# Patient Record
(demographics unavailable — no encounter records)

---

## 2018-12-22 NOTE — RAD
RIGHT KNEE THREE VIEWS:

12/22/18

 

HISTORY: 

Knee injury.

 

Bones are demineralized. There is some mild arthritic changes in the knee without signs of fracture. 
There is a chondroid type lesion of the distal femoral shaft. This could represent an enchondroma. Th
e size raises the possibility of a low grade chondrosarcoma. I do not have any old films for comparis
on. 

 

IMPRESSION:  

1.      Chondroid lesion of the distal femoral shaft probably benign but followup examination in six 
months would be recommended for assessment. 

2.      No evidence of fracture. 

 

POS: [facility]

## 2018-12-22 NOTE — RAD
RIGHT HIP TWO VIEWS:

12/22/18

 

HISTORY: 

Fall with hip pain.

 

There is an intertrochanteric fracture of the right hip with associated coxa vara deformity. Old trau
ma to the symphysis region is noted.

 

IMPRESSION:  

Acute intertrochanteric fracture of the right hip. 

 

POS: [facility]

## 2018-12-23 NOTE — CON
DATE OF CONSULTATION:  2018



CHIEF COMPLAINT:  Right hip pain.



CONSULTING PHYSICIANS:  ER.



HISTORY OF PRESENT ILLNESS:  Ms. Flores is an 89-year-old female, status post 
fall

while walking in her house.  Denies loss of conscious or headaches.  The patient

got up without her walker, slipped and fell on her right hip and complained of

right hip pain.  The patient's family is at bedside.  She lives at home alone 
with

the recent death of her .  The patient's caretakers are close by. 



PAST MEDICAL HISTORY:  Arthritis, high cholesterol, hypertension, hypothyroidism
,

history of pelvis fracture. 



PAST SURGICAL HISTORY:  .



ALLERGIES:  NO KNOWN DRUG ALLERGIES.



MEDICATIONS:  

1. Tylenol.

2. Fosamax.

3. Aspirin.

4. Lipitor.

5. Atrovent.

6. Celebrex.

7. Flonase.

8. Synthroid.

9. Myrbetriq.

10. Zestril.

11. Zoloft.

12. Zantac.

13. Tylenol p.m.

14. Zyrtec.

15. Vitamin D.



SOCIAL HISTORY:  Nonsmoker and nondrinker.  Recent passing of her .



REVIEW OF SYSTEMS:  Noncontributory.



PHYSICAL EXAMINATION:

VITAL SIGNS:  The patient's vital signs are; temperature 97.9, pulse rate 100,

respiratory rate 18, 92% on room air, and blood pressure 145/76. 

GENERAL:  Alert and oriented female, in no acute distress. 

EXTREMITIES:  Right lower extremity, no skin lesions.  External rotated hip with

shortening.  The patient has brisk cap refill.  She has sensation intact L4 
through

S1 distribution in plantar flexion, dorsiflexion, extendsand flexes her toes.  
She

has pain with internal and external rotation. 



LABORATORY DATA:  The patient's labs show H and H of 10 and 30.  INR of 1.  She 
has

creatinine of 0.7.  She has moderate leukocyte esterase. 



RADIOGRAPHIC DATA:  The patient's radiographs show a right intertrochanteric hip

fracture with reverse obliquity pattern. 



IMPRESSION:  Right intertrochanteric hip fracture.



ASSESSMENT AND PLAN:  The patient was taken the OR today for a right 
intramedullary

nailing with cephalomedullary nail.  Discussed with family the risks and 
benefits of

surgery, pain, scar, bleeding, infection, damage to vital structures, nonunion,

malunion, failure of procedure, fracture, loss of life or limb.  She

understands the risks and benefits and understands, she will loose her level of

function.  I discussed with them the mortality associated with this fracture.  
The

family understands all this and they would like to proceed.  We will take her to

surgery today. 







Job ID:  [id number]



MTDD

## 2018-12-23 NOTE — HP
ATTENDING SURGEON:  Dr. Hagan.



HISTORY OF PRESENT ILLNESS:  An 89-year-old female, had a ground-level fall. Te

patient was in her kitchen and not using her walker like she was supposed to, and

when she went to turn, slipped and fell.  The patient reports right leg pain and

right knee pain.  Denies loss of consciousness.  Denies hitting her head.  The

patient activated her medical alert immediately.  The patient was brought in by EMS

with right external rotation and shortening.  The patient was evaluated by the ER.

The patient with a right intertrochanteric fracture.  The patient was given morphine

and Zofran in the ER for pain.  The patient currently resting supine in no distress.

 The patient's daughter is at bedside, who states that the patient does live alone

and her  just recently passed away a few days ago.  The patient has

caretakers whom come and check on her often and the daughter lives very close by.

The daughter does report that she does get confused sometimes. 



PAST MEDICAL HISTORY:  Arthritis, high cholesterol, hypertension, hypothyroidism.

The patient had a fall in  with pelvic fracture nonoperative, the patient went

to rehab at that time, also back injury nonoperative where she completed rehab. 



PAST SURGICAL HISTORY:  .



ALLERGIES:  NO KNOWN DRUG ALLERGIES.



HOME MEDICATIONS:  

1. Acetaminophen 1 g daily.

2. Fosamax 70 mg weekly.

3. Aspirin 81 mg daily.

4. Lipitor 40 mg daily.

5. Atrovent as needed.

6. Celebrex 200 mg.

7. Flonase as needed.

8. Synthroid 75 mcg on Monday through Saturday, 112 on .

9. Myrbetriq 50 mcg.

10. Zestril 5 mg p.o. daily.

11. Zoloft 100 mg daily.

12. Zantac twice a day 150.

13. Tylenol PM Extra Strength 2 nightly.

14. Zyrtec daily.

15. Vitamin D daily.



REVIEW OF SYSTEMS:  GENERAL:  Denies any recent illness.  Denies any chills or

fever. 

HEENT:  Denies any vision changes.  Denies neck pain. 

CARDIOVASCULAR:  Denies any chest pain or palpitations or previous cardiovascular

history. 

RESPIRATORY:  Denies any cough, shortness of breath, or wheezing. 

GI:  Denies abdominal pain.  Denies nausea, vomiting, or diarrhea.  The patient does

report issues with constipation. 

GI:  Denies any dysuria or frequency. 

MUSCULOSKELETAL:  Complaints of right knee pain and right thigh pain.  Denies any

back pain. 

NEUROLOGIC:  Denies headache.  Denies mental status changes.  Denies sensory deficit

changes. 



PHYSICAL EXAMINATION:

VITAL SIGNS:  Blood pressure 146/77, SpO2 93% on room air, heart rate 91,

respirations 16, temperature 98.1. 

GENERAL:  The patient is awake, alert, in no distress; oriented to person, place,

and event. 

HEENT:  The patient has a small abrasion under the nose, otherwise head is

atraumatic.  Pupils equal and reactive at 4 mm. 

NECK:  Trachea is midline.  The patient has no neck tenderness.  She has full range

of motion. 

SKIN:  Skin is dry.  No bruises or rashes noted. 

CARDIOVASCULAR:  The patient with regular rate and rhythm.  There are no murmurs. 

RESPIRATORY:  Respirations even and unlabored.  Chest is symmetrical.  Equal breath

sounds.  There are no wheezes, crackles, or rales. 

ABDOMEN:  Soft, nontender.  Active bowel sounds. 

MUSCULOSKELETAL:  The patient with right lower leg shortening and external rotation.

 Positive +2 pedal pulses bilaterally.  The patient moves all extremities and

sensation noted in all extremities.  The patient does have a small abrasion to the

right knee.  There is no deformity noted to the knee or swelling. 

NEUROLOGIC:  Cranial nerves are intact.  Sensation and strength, intact.



LABORATORY DATA:  WBC 12.7, RBC 3.79, hemoglobin 11.1, hematocrit 35.0, platelets

293.  PT 12.7, INR 1.0, aPTT 30.9.  Sodium 139, potassium 4.4, chloride 105, BUN 22,

creatinine 0.80, glucose 131, calcium 9.2.  AST 23, ALT 15, alkaline phosphatase 87.

 Troponin less than 0.010.  Albumin 7.0.  A UA was collected that has not resulted

at this time. 



DIAGNOSTIC DATA:  Acute intertrochanteric fracture of the right hip.  Chest x-ray,

mildly hyperinflated lungs, no acute cardiopulmonary process.  Official read is

unavailable. 



EKG shows normal sinus rhythm.  There is no ST elevation.



ASSESSMENT:  

1. Mechanical fall.

2. Acute traumatic pain.

3. Right hip fracture.

4. History of arthritis.

5. History of hypertension.



PLAN:  We will admit the patient to the Surgical/Ortho Floor.  Dr. Flower has

already been contacted by the ER provider and was notified.  We will take the

patient to the OR in the morning.  No specific time reported.  We will place the

patient on pain regimen and bedrest.  The patient has been n.p.o. since 7 p.m.  We

will place PT/OT consult, also prepare for placement to rehab.  This patient has

been discussed with the attending physician. 







Job ID:  [id number]

## 2018-12-23 NOTE — RAD
PORTABLE CHEST 1 VIEW:

 

Date:  12/23/18 

Time:  0011 hours

 

HISTORY:  

Chest pain. 

 

FINDINGS:

 

Comparison made with exam of 01/13/16. 

 

The heart size is normal. The aorta is tortuous. No lobar consolidation, pneumothoraces, enma pulmon
dana edema, or pleural effusions are seen. 

 

IMPRESSION: 

No acute process. 

 

 

 

POS: [facility]

## 2018-12-23 NOTE — RAD
FRONTAL RADIOGRAPH PELVIS:

 

Date:  12/23/18 

 

COMPARISON:  

03/14/16. 

 

HISTORY:  

Injury, trauma, pain. 

 

FINDINGS:

There are severe degenerative changes involving bilateral hips with joint space narrowing, subchondra
l sclerosis, and osteophyte formation. There are old fractures of the superior and inferior pubic paulie
i medially bilaterally, and there are multiple incompletely assessed lower lumbar spine fractures, al
so which appear stable. There is a new, comminuted, obliquely oriented fracture of the proximal right
 femur, which is probably subtrochanteric in nature and incompletely assessed on this examination. Th
e lesser trochanter appears proximally and medially displaced, and the distal fracture fragment demon
strates varus angulation. 

 

IMPRESSION: 

Proximal right femur fracture. 

 

 

POS: [facility]

## 2018-12-23 NOTE — PRG
DATE OF SERVICE:  12/23/2018



SUBJECTIVE:  An 89-year-old female with ground level fall.  Postop day zero.  The

patient is awake, alert.  Pain is well controlled at this time.  The patient is

tolerating a regular diet at this time.  The patient voices no concerns other than

she typically takes 2 Tylenol PM for sleep. 



OBJECTIVE:  VITAL SIGNS:  Temperature 97.9, pulse 76, respirations 16, SpO2 of 95%

on room air.  Blood pressure 126/60. 

GENERAL:  The patient awake, alert, in no distress. 

RESPIRATORY:  Respirations even, nonlabored, symmetrical, no distress. 

ABDOMEN:  Soft, nontender.  Active bowel sounds. 

MUSCULOSKELETAL:  Mild pain to right hip area.  Positive distal pulses and

sensation.  Able to move all extremities. 

NEUROLOGIC:  Cranial nerves intact.  The patient has normal strength.



LABORATORY DATA:  WBC 10.7, RBC 3.32, hemoglobin 10.1, hematocrit 30.5, platelets

249.  Sodium 135, potassium 4.4, chloride 106, CO2 of 21, BUN 21, creatinine 0.70,

GFR 79, glucose 122, calcium 8.7.  The patient with trace ketones in her urine,

moderate leukocyte esterase, urine wbc's 7 to 10, squamous cells high.  The patient

with Hyaline cast. 



ASSESSMENT AND PLAN:  

1. Ground level fall.

2. Right hip fracture, postop day zero open reduction and internal fixation.

3. Acute traumatic pain.

4. History of arthritis and hypertension.

5. Urinary tract infection.



PLAN:  We will leave Nino overnight and monitor urine output.  We will continue IV

fluids overnight.  We will place the patient on a p.o. pain regimen.  PT/OT has been 

consulted and will work with the patient starting tomorrow.  Continue a regular

diet.  We will order melatonin for sleep instead of Tylenol PM that the patient

usually takes since 

the patient is getting pain medication and Tylenol already.  The patient started on

antibiotics for UTI.  Plan has been discussed with the attending surgeon. 







Job ID:  [id number]

## 2018-12-23 NOTE — OP
DATE OF PROCEDURE:  12/23/2018



PREOPERATIVE DIAGNOSIS:  Right intertrochanteric with subtrochanteric extension

4-part proximal femur fracture. 



PROCEDURE: Cephallomedullary nailng, TFNA



ASSISTANT:  Walter.



ANESTHESIA:  Joaquin. The patient received a general endotracheal intubation.



ESTIMATED BLOOD LOSS:  100 mL.



TOURNIQUET TIME:  None.



IMPLANTS:  Synthes 11 x 130-degree x 400 mm TFNA nail, a 95 mm TFNA screw, and 
a 5

mm distal locking screw. 



ANTIBIOTICS:  Ancef 2 g, 500 mg of Levaquin, and TXA 1 g.



COMPLICATIONS:  None.



INDICATIONS FOR PROCEDURE:  Ms. Flores is a pleasant 89-year-old female, 
status

post ground level fall with a right proximal femur fracture and subtrochanteric

extension. I discussed the risks and benefits of surgery, pain, scar, bleeding,

infection, damage to vital structures, decreased range of motion and strength,

continued pain despite surgical intervention, loss of life or limb, and failure 
of

procedure. The patient understood all these risks and benefits and elected to

proceed. 



DESCRIPTION OF PROCEDURE:  Time-out was performed. The patient's right lower

extremity as the operative site based on site, consents, and markings. After

time-out, the patient's right leg was put in traction. I made a posterior 
oblique

incision just proximal to the greater tip down to the greater trochanter, felt 
based

on AP and lateral radiographs, the front-to-back.  We then placed our starting

point. We then opened the reamer and reamed with opening reamer. We then passed 
our

guidewire. Noticed that there was a little apex anterior deformity, which made a

small stab incision and used a Kocher to push down the anterior fragment wall 
for

reduction for reaming. We went distally. The 10 was actually being medialized 
by the

enchondroma within the femur. We therefore used a finger to help to push 
through the

enchondroma and placed it more in a center-center position. After placing it in

center-center position, we reamed up to an 11, felt we got good overall 
alignment

and positioning. We then removed, placed our implant and after it measured 11 x 
400,

felt we had a good stable positioning. It helped to reduce the fracture. 
Testing our

position, got the gripping of the metaphysis when we reamed, measured for 95 and

placed 95. We compressed and __________ down to allow for sliding. We let the

traction off the femur. We then took pictures. We were happy with the alignment 
of

the proximal femur. We moved distally. We placed a distal screw in the static

position with two cortices, and we made a small stab incision. We came back. We

washed and closed all the holes for screws as well as our stab incision and our

entry nails, positioned with O2 and staples. 



The patient will be admitted to the hospital. She will receive antibiotics for 
24

hours. She will be weightbearing as tolerated. We will follow inhouse. 







Job ID:  [id number]



MTDD

## 2018-12-24 NOTE — PRG
DATE OF SERVICE:  12/24/2018



SUBJECTIVE:  This is an 89-year-old female with a ground level fall.  She is postop

day #1, status post ORIF of the right hip.  The patient is awake and alert in bed,

and pain is well controlled at this time.  The patient continues to tolerate a

regular diet.  The patient voices no concerns at this time. 



OBJECTIVE:  VITAL SIGNS:  Temperature 98.5, respirations 16, SpO2 96% on room air,

and blood pressure 110/60. 

GENERAL:  The patient is awake, alert, sitting up in bed, in no distress. 

RESPIRATORY:  Respirations even nonlabored, symmetrical, no distress. 

ABDOMEN:  Soft, nontender.  Active bowel sounds. 

MUSCULOSKELETAL:  Moves all extremities.  Distal pedal pulses.  Normal sensation. 

NEUROLOGIC:  No deficits noted.



LABORATORY DATA:  White blood count 7.3, RBC 2.28, hemoglobin 6.9, hematocrit 21.1,

and platelet 203.  Sodium 134, potassium 4.1, chloride 104, CO2 25, BUN 22,

creatinine 0.87, GFR 61, glucose 114, calcium 8.5, phosphorus 3.5, and magnesium

1.7. 



ASSESSMENT:  

1. Ground level fall.

2. Right hip fracture, postop day 1 open reduction and internal fixation.

3. Acute traumatic pain.

4. History of arthritis and hypertension.

5. Urinary tract infection.



PLAN:  We will transfuse 1 unit of packed red blood cells.  The patient started on

iron and vitamin C.  We will repeat H and H this evening.  Continue the patient's

Macrobid for UTI.  Continue PT/OT.  Case Management to help with placement and

rehab.  We will continue the patient's pain regimen.  The patient has been discussed

with the attending surgeon. 







Job ID:  [id number]

## 2018-12-24 NOTE — RAD
RIGHT HIP 2 VIEWS:

 

Date:  12/23/18 

 

HISTORY:  

Open reduction and internal fixation right hip, right femur fracture. 

 

FINDINGS/IMPRESSION: 

Three spot fluoroscopic intraoperative images of the right hip demonstrating interval reduction and i
nternal fixation of the proximal femoral fracture since the previous day's exam. 

 

 

POS: [facility]

## 2018-12-25 NOTE — HP
CHIEF COMPLAINT:  Right hip pain.



HISTORY OF PRESENT ILLNESS:  Ms. Flores is an 89-year-old woman, who fell at home,

landing on her right side. She had an intertrochanteric right femur fracture

underwent ORIF by Orthopedics last night and I saw the patient on morning rounds

with Scott Johnson, Trauma PA and Chery Brunson, Trauma Nurse Practitioner. For full

details, please see Ms. Nannetet's note, the details of which I have confirmed. The

patient denies any lightheadedness or dizziness preceding her fall and states that

she simply lost her footing because she slipped and was not using her walker. She

denies loss of consciousness or amnesia to the event. She does live alone and her

family reports some episodes of confusion. She has multiple medical problems, which

are managed on medication and her long list of medications was reviewed. 



REVIEW OF SYSTEMS:  Negative except for right hip pain, which she states is much

better than it was before the surgery. 



Labs and x-rays are reviewed and I agree with the written report.



ASSESSMENT:  Status post repair of right intertrochanteric femur fracture. The

patient is doing well overall and needs to work with Physical Therapy. She does live

alone since her  recently  and may need placement at least for short-term

rehab or possibly nursing home placement depending on her functional status. She is

quite frail and did receive 1 unit transfusion for a postoperative hemoglobin of

6.9. Other labs were stable. We will watch her carefully over the next few days as

she recovers and get her up to work with Physical Therapy as soon as possible. 







Job ID:  [id number]

## 2018-12-25 NOTE — PRG
DATE OF SERVICE:  12/25/2018



SUBJECTIVE:  The patient is status post open reduction and internal fixation of a

right hip fracture.  She is currently postop day #2.  She has had no issues

overnight.  States her pain is controlled.  She is tolerating a diet.  The patient

did receive 1 unit of packed red blood cells yesterday for hemoglobin of 6.9.  The

patient has worked briefly with physical therapy today and is expected to work again

this afternoon. 



PHYSICAL EXAMINATION:

VITAL SIGNS:  Temperature is 98.4, heart rate 90, blood pressure 174/77,

respirations 14, oxygen saturation 93% on room air. 

GENERAL:  The patient is resting comfortably in bed.  She is awake, alert, oriented,

and appropriate. 

HEENT:  Unremarkable. 

LUNGS:  Clear to auscultation with good inspiratory and expiratory effort. 

HEART:  Regular rate and rhythm. 

ABDOMEN:  Soft, flat, nontender with active bowel sounds. 

EXTREMITIES:  Neurovascularly intact x4.



LABORATORY FINDINGS:  White blood cell count 6.5, hemoglobin 8.5, hematocrit 25.2,

platelets 186.  Sodium 139, potassium 4.0, chloride 108, CO2 of 25, BUN 16,

creatinine 0.68, glucose 95, magnesium 1.8.  Phosphorus 2.9.  There are no

radiographs reviewed this morning. 



ASSESSMENT:  

1. Status post ground level fall.

2. Status post open reduction and internal fixation of right hip fracture.



PLAN:  Plan will be to continue supportive care, physical and occupational therapy.

Repeat labs one more time tomorrow.  Work with Case Management on placement, and the

patient is also under treatment for a urinary tract infection, which should complete

her course of antibiotics during this hospital stay.  The evaluation and examination

were done with Dr. Hagan during rounds this morning. 





Job ID:  [id number]

## 2018-12-26 NOTE — PRG
DATE OF SERVICE:  12/26/2018



SUBJECTIVE:  The patient remains on the surgical floor.  She is status post ground

level fall, in which she sustained a right hip fracture.  She has undergone open

reduction and internal fixation of same.  She is currently postop day #3.  The

patient had an episode of emesis this morning when trying to work with physical

therapy.  She is also noted to have not had her Nino removed, so we will have this

discontinued this morning.  Otherwise, the patient had no issues overnight.  We are

awaiting final determination of placement. 



PHYSICAL EXAMINATION:

VITAL SIGNS:  Temperature is 98.3, heart rate 97, blood pressure 151/88,

respirations 16, oxygen saturation 93% on room air. 

GENERAL:  The patient is currently sleeping.  Her daughter relays that she was awake

and alert when she was working with therapy and then had her bout of emesis.  She

was put back in the bed and has been resting since then.  Of note, prior to this

dictation, nurses relayed that the patient was able to be up in the chair at bedside

for approximately 30 to 45 minutes. 

HEENT:  Unremarkable. 

LUNGS:  Clear to auscultation. 

HEART:  Regular rate and rhythm. 

ABDOMEN:  Soft and nondistended with active bowel sounds. 

EXTREMITIES:  Neurovascularly intact x4.  Postop dressing is clean, dry, and intact.



LABORATORY FINDINGS:  White blood cell count 7.2, hemoglobin 8.8, hematocrit 26.4,

platelets 232.  Sodium 139, potassium 4.0, chloride 108, CO2 of 25, BUN 16,

creatinine 0.68, glucose 95, magnesium 1.8, phosphorus 2.9.  There are no

radiographs reviewed this morning. 



ASSESSMENT:  

1. Status post ground level fall.

2. Status post open reduction and internal fixation of right hip fracture.



PLAN:  Plan will be to continue supportive care, physical and occupational therapy

and await final placement decision, who will discontinue the Nino this afternoon

and also add lactulose to her bowel regimen. 







Job ID:  [id number]

## 2018-12-27 NOTE — DIS
DATE OF ADMISSION:  12/23/2018



DATE OF DISCHARGE:  12/27/2018



ADMISSION DIAGNOSES:  

1. Status post fall.

2. Right hip fracture.



CONSULTATIONS:  Orthopedics, Dr. Flower.



PROCEDURE:  Cephalomedullary nailing, TFNA.



SUMMARY:  The patient is an 89-year-old woman, who reportedly had a fall at home,

was brought to the emergency department, underwent evaluation and examination and

was noted to have the above injury.  The patient will be taken to the operating room

the following day with Dr. Flower to undergo her above procedure, which she

tolerated well.  Over the next few days, she would work with Physical and

Occupational Therapy and will be progressing slowly and also had a delay in her stay

here due to the holidays, but was eventually able to be transferred to [facility] to

continue care.  At the time of discharge, the patient again was working with

Physical and Occupational Therapy.  Her pain was controlled.  She was tolerating a

diet.  Her bowel and bladder function had returned.  The patient will follow up with

Dr. Flower in 2 weeks or sooner as needed.  The patient may follow up with the

Trauma Clinic if needed. 







Job ID:  [id number]

## 2018-12-28 NOTE — EKG
Test Reason : 

Blood Pressure : ***/*** mmHG

Vent. Rate : 087 BPM     Atrial Rate : 085 BPM

   P-R Int : 000 ms          QRS Dur : 078 ms

    QT Int : 380 ms       P-R-T Axes : 000 009 -12 degrees

   QTc Int : 457 ms

 

Accelerated Junctional rhythm

Abnormal ECG

 

Confirmed by ADRIAN JAMISON ([id number]),  OZZIE PANIAGUA ([id number]) on 12/28/2018 3:34:52 PM

 

Referred By:             Confirmed By:ADRIAN JAMISON